# Patient Record
Sex: FEMALE | Race: WHITE | NOT HISPANIC OR LATINO | Employment: FULL TIME | URBAN - METROPOLITAN AREA
[De-identification: names, ages, dates, MRNs, and addresses within clinical notes are randomized per-mention and may not be internally consistent; named-entity substitution may affect disease eponyms.]

---

## 2023-10-12 ENCOUNTER — OFFICE VISIT (OUTPATIENT)
Dept: URGENT CARE | Facility: CLINIC | Age: 18
End: 2023-10-12
Payer: COMMERCIAL

## 2023-10-12 VITALS
RESPIRATION RATE: 16 BRPM | SYSTOLIC BLOOD PRESSURE: 125 MMHG | OXYGEN SATURATION: 100 % | DIASTOLIC BLOOD PRESSURE: 85 MMHG | WEIGHT: 160 LBS | HEART RATE: 86 BPM

## 2023-10-12 DIAGNOSIS — M79.645 PAIN OF FINGER OF LEFT HAND: Primary | ICD-10-CM

## 2023-10-12 DIAGNOSIS — S63.635A SPRAIN OF INTERPHALANGEAL JOINT OF LEFT RING FINGER, INITIAL ENCOUNTER: ICD-10-CM

## 2023-10-12 PROCEDURE — 96372 PR INJECTION,THERAP/PROPH/DIAG2ST, IM OR SUBCUT: ICD-10-PCS | Mod: S$GLB,,, | Performed by: STUDENT IN AN ORGANIZED HEALTH CARE EDUCATION/TRAINING PROGRAM

## 2023-10-12 PROCEDURE — 99204 PR OFFICE/OUTPT VISIT, NEW, LEVL IV, 45-59 MIN: ICD-10-PCS | Mod: 25,S$GLB,, | Performed by: STUDENT IN AN ORGANIZED HEALTH CARE EDUCATION/TRAINING PROGRAM

## 2023-10-12 PROCEDURE — 96372 THER/PROPH/DIAG INJ SC/IM: CPT | Mod: S$GLB,,, | Performed by: STUDENT IN AN ORGANIZED HEALTH CARE EDUCATION/TRAINING PROGRAM

## 2023-10-12 PROCEDURE — 99204 OFFICE O/P NEW MOD 45 MIN: CPT | Mod: 25,S$GLB,, | Performed by: STUDENT IN AN ORGANIZED HEALTH CARE EDUCATION/TRAINING PROGRAM

## 2023-10-12 RX ORDER — KETOROLAC TROMETHAMINE 30 MG/ML
60 INJECTION, SOLUTION INTRAMUSCULAR; INTRAVENOUS
Status: COMPLETED | OUTPATIENT
Start: 2023-10-12 | End: 2023-10-12

## 2023-10-12 RX ORDER — NAPROXEN 500 MG/1
500 TABLET ORAL 2 TIMES DAILY WITH MEALS
Qty: 20 TABLET | Refills: 0 | Status: SHIPPED | OUTPATIENT
Start: 2023-10-12 | End: 2023-10-22

## 2023-10-12 RX ADMIN — KETOROLAC TROMETHAMINE 60 MG: 30 INJECTION, SOLUTION INTRAMUSCULAR; INTRAVENOUS at 10:10

## 2023-10-12 NOTE — PROGRESS NOTES
Subjective:      Patient ID: Gabbie Peterson is a 18 y.o. female.    Vitals:  weight is 72.6 kg (160 lb). Her blood pressure is 125/85 and her pulse is 86. Her respiration is 16 and oxygen saturation is 100%.     Chief Complaint: Hand Pain (She was cracking her knuckles and hurt her left ring finger)    Patient is a 18-year-old female who presents to clinic via mother for evaluation of finger pain.  Patient reports symptoms x2 days.  Patient states she was cracked her knuckles when she heard a crack and felt worsening pain on her left ring finger.  Patient states that she has taken over-the-counter medications with no significant relief to symptoms.  Patient states that symptoms have progressively become more sore and swollen.  Patient states woke up this morning with it harder to being.  Patient states that she experiences constant throbbing pain to the finger.  Patient states intermittent sharp shooting pains into the hand primarily with movement.  Patient states pain at current is rated a 7 on 10 scale but as worst a 10 on 10 scale.  Patient states that she has not noticed any skin discoloration such as redness or bruising, paresthesias such as numbness or tingling, or any open wounds to the finger.  Patient states she has not broke or dislocated her finger in the past.    Hand Pain   The incident occurred 2 days ago. The incident occurred at home. There was no injury mechanism. The pain is present in the left fingers. The pain radiates to the left hand and left arm. Pertinent negatives include no chest pain or numbness. The symptoms are aggravated by movement and palpation. She has tried immobilization for the symptoms.       Constitution: Negative. Negative for chills, sweating, fatigue and fever.   HENT: Negative.     Neck: neck negative.   Cardiovascular: Negative.  Negative for chest pain.   Eyes: Negative.    Respiratory: Negative.  Negative for chest tightness, cough and shortness of breath.     Gastrointestinal: Negative.  Negative for abdominal pain, nausea, vomiting and diarrhea.   Endocrine: negative.   Genitourinary: Negative.    Musculoskeletal:  Positive for trauma (Hurt finger while cracking her knuckles), joint pain (Left ring finger), joint swelling (Left ring finger) and abnormal ROM of joint (Left ring finger).   Skin: Negative.  Negative for color change, pale, rash, wound and erythema.   Allergic/Immunologic: Negative.    Neurological: Negative.  Negative for dizziness, light-headedness, passing out, headaches, disorientation, altered mental status, numbness and tingling.   Hematologic/Lymphatic: Negative.    Psychiatric/Behavioral: Negative.  Negative for altered mental status, disorientation and confusion.       Objective:     Physical Exam   Constitutional: She is oriented to person, place, and time. She appears well-developed. She is cooperative.  Non-toxic appearance. She does not appear ill. No distress.   HENT:   Head: Normocephalic and atraumatic.   Ears:   Right Ear: Hearing, tympanic membrane, external ear and ear canal normal.   Left Ear: Hearing, tympanic membrane, external ear and ear canal normal.   Nose: Nose normal. No mucosal edema or nasal deformity. No epistaxis. Right sinus exhibits no maxillary sinus tenderness and no frontal sinus tenderness. Left sinus exhibits no maxillary sinus tenderness and no frontal sinus tenderness.   Mouth/Throat: Uvula is midline, oropharynx is clear and moist and mucous membranes are normal. No trismus in the jaw. Normal dentition. No uvula swelling.   Eyes: Conjunctivae and lids are normal. Pupils are equal, round, and reactive to light.   Neck: Trachea normal and phonation normal. Neck supple.   Cardiovascular: Normal rate, regular rhythm, normal heart sounds and normal pulses.   Pulmonary/Chest: Effort normal and breath sounds normal. No respiratory distress. She has no wheezes. She has no rhonchi. She has no rales.   Abdominal: Normal  appearance and bowel sounds are normal. She exhibits no distension. Soft. There is no abdominal tenderness.   Musculoskeletal:         General: Signs of injury present.      Left hand: Left ring finger: Exhibits decreased ROM, swelling and tenderness (Primarily at the PIP location).   Neurological: She is alert and oriented to person, place, and time. She exhibits normal muscle tone.   Skin: Skin is warm, dry, intact, not diaphoretic, not pale and no rash. Capillary refill takes less than 2 seconds. No bruising and No erythema   Psychiatric: Her speech is normal and behavior is normal. Judgment and thought content normal.   Nursing note and vitals reviewed.      Assessment:     1. Pain of finger of left hand    2. Sprain of interphalangeal joint of left ring finger, initial encounter        Plan:       Pain of finger of left hand  -     XR HAND COMPLETE 3 VIEW LEFT; Future; Expected date: 10/12/2023    Sprain of interphalangeal joint of left ring finger, initial encounter    Other orders  -     ketorolac injection 60 mg  -     naproxen (NAPROSYN) 500 MG tablet; Take 1 tablet (500 mg total) by mouth 2 (two) times daily with meals. for 10 days  Dispense: 20 tablet; Refill: 0                X-ray left hand: There is no evidence acute bony injury of the left hand.  Toradol 60 mg IM in clinic.  Patient tolerated well.  No complications noted.    Take medications as prescribed.  Use of no other NSAIDs while on naproxen; may rotate with Tylenol.    Finger splint applied in clinic.  Patient tolerated well.  No complications noted.    Rest.  Ice.  Splint.  Elevation.    Follow-up with PCP in 1-2 days.    Follow-up orthopedics if symptoms not better within 1-2 weeks.    To ED for any new or acutely worsening symptoms.    Patient in agreement with plan of care.    DISCLAIMER: Please note that my documentation in this Electronic Healthcare Record was produced using speech recognition software and therefore may contain errors  related to that software system.These could include grammar, punctuation and spelling errors or the inclusion/exclusion of phrases that were not intended. Garbled syntax, mangled pronouns, and other bizarre constructions may be attributed to that software system.

## 2023-11-26 ENCOUNTER — OFFICE VISIT (OUTPATIENT)
Dept: URGENT CARE | Facility: CLINIC | Age: 18
End: 2023-11-26
Payer: COMMERCIAL

## 2023-11-26 VITALS
HEART RATE: 101 BPM | OXYGEN SATURATION: 99 % | SYSTOLIC BLOOD PRESSURE: 120 MMHG | TEMPERATURE: 98 F | HEIGHT: 69 IN | BODY MASS INDEX: 23.7 KG/M2 | DIASTOLIC BLOOD PRESSURE: 72 MMHG | WEIGHT: 160 LBS

## 2023-11-26 DIAGNOSIS — J20.9 ACUTE BRONCHITIS, UNSPECIFIED ORGANISM: Primary | ICD-10-CM

## 2023-11-26 DIAGNOSIS — R05.9 COUGH, UNSPECIFIED TYPE: ICD-10-CM

## 2023-11-26 LAB
CTP QC/QA: YES
CTP QC/QA: YES
FLUAV AG NPH QL: NEGATIVE
FLUBV AG NPH QL: NEGATIVE
SARS-COV-2 AG RESP QL IA.RAPID: NEGATIVE

## 2023-11-26 PROCEDURE — 87804 POCT INFLUENZA A/B: ICD-10-PCS | Mod: QW,,, | Performed by: NURSE PRACTITIONER

## 2023-11-26 PROCEDURE — 87811 SARS CORONAVIRUS 2 ANTIGEN POCT, MANUAL READ: ICD-10-PCS | Mod: QW,S$GLB,, | Performed by: EMERGENCY MEDICINE

## 2023-11-26 PROCEDURE — 96372 THER/PROPH/DIAG INJ SC/IM: CPT | Mod: S$GLB,,, | Performed by: NURSE PRACTITIONER

## 2023-11-26 PROCEDURE — 99213 OFFICE O/P EST LOW 20 MIN: CPT | Mod: 25,S$GLB,, | Performed by: EMERGENCY MEDICINE

## 2023-11-26 PROCEDURE — 87811 SARS-COV-2 COVID19 W/OPTIC: CPT | Mod: QW,S$GLB,, | Performed by: EMERGENCY MEDICINE

## 2023-11-26 PROCEDURE — 87804 INFLUENZA ASSAY W/OPTIC: CPT | Mod: QW,,, | Performed by: NURSE PRACTITIONER

## 2023-11-26 PROCEDURE — 96372 PR INJECTION,THERAP/PROPH/DIAG2ST, IM OR SUBCUT: ICD-10-PCS | Mod: S$GLB,,, | Performed by: NURSE PRACTITIONER

## 2023-11-26 PROCEDURE — 99213 PR OFFICE/OUTPT VISIT, EST, LEVL III, 20-29 MIN: ICD-10-PCS | Mod: 25,S$GLB,, | Performed by: EMERGENCY MEDICINE

## 2023-11-26 RX ORDER — AZITHROMYCIN 250 MG/1
TABLET, FILM COATED ORAL
Qty: 6 TABLET | Refills: 0 | Status: SHIPPED | OUTPATIENT
Start: 2023-11-26 | End: 2023-12-01

## 2023-11-26 RX ORDER — ALBUTEROL SULFATE 90 UG/1
2 AEROSOL, METERED RESPIRATORY (INHALATION) EVERY 6 HOURS PRN
Qty: 18 G | Refills: 0 | Status: SHIPPED | OUTPATIENT
Start: 2023-11-26 | End: 2024-11-25

## 2023-11-26 RX ORDER — DEXAMETHASONE SODIUM PHOSPHATE 4 MG/ML
4 INJECTION, SOLUTION INTRA-ARTICULAR; INTRALESIONAL; INTRAMUSCULAR; INTRAVENOUS; SOFT TISSUE ONCE
Status: COMPLETED | OUTPATIENT
Start: 2023-11-26 | End: 2023-11-26

## 2023-11-26 RX ADMIN — DEXAMETHASONE SODIUM PHOSPHATE 4 MG: 4 INJECTION, SOLUTION INTRA-ARTICULAR; INTRALESIONAL; INTRAMUSCULAR; INTRAVENOUS; SOFT TISSUE at 04:11

## 2023-11-26 NOTE — PROGRESS NOTES
"Subjective:      Patient ID: Gabbie Peterson is a 18 y.o. female.    Vitals:  height is 5' 9" (1.753 m) and weight is 72.6 kg (160 lb). Her oral temperature is 98.3 °F (36.8 °C). Her blood pressure is 120/72 and her pulse is 101. Her oxygen saturation is 99%.     Chief Complaint: Chest Pain (Back pain)    Chest Pain   This is a new problem. The current episode started today. The onset quality is sudden. The problem occurs constantly. The problem has been unchanged. The pain is present in the substernal region. The pain is at a severity of 5/10. The pain is mild. The quality of the pain is described as burning and heavy. The pain radiates to the upper back. Associated symptoms include a cough, dizziness, headaches and irregular heartbeat. The cough has no precipitants. The cough is Non-productive. There is no color change associated with the cough. Nothing relieves the cough. She has tried nothing for the symptoms.     Cardiovascular:  Positive for chest pain.   Respiratory:  Positive for cough.    Neurological:  Positive for dizziness and headaches.    Objective:     Physical Exam    Assessment:     No diagnosis found.    Plan:       There are no diagnoses linked to this encounter.                "

## 2023-11-27 NOTE — PROGRESS NOTES
"Subjective:      Patient ID: Gabbie Peterson is a 18 y.o. female.    Vitals:  height is 5' 9" (1.753 m) and weight is 72.6 kg (160 lb). Her oral temperature is 98.3 °F (36.8 °C). Her blood pressure is 120/72 and her pulse is 101. Her oxygen saturation is 99%.     Chief Complaint: Chest Pain (Back pain)    Presents accompanied by mom with sudden onset chest tightness while at work today.  Similar to chest tightness she experienced her last course of covid.        Constitution: Negative for chills and fever.   Respiratory:  Positive for chest tightness, cough and wheezing.       Objective:     Physical Exam   Constitutional: She is oriented to person, place, and time. She appears well-developed. She is cooperative.  Non-toxic appearance. She does not appear ill. No distress.   HENT:   Head: Normocephalic and atraumatic.   Ears:   Right Ear: Hearing, tympanic membrane, external ear and ear canal normal.   Left Ear: Hearing, tympanic membrane, external ear and ear canal normal.   Nose: Nose normal. No mucosal edema, rhinorrhea or nasal deformity. No epistaxis. Right sinus exhibits no maxillary sinus tenderness and no frontal sinus tenderness. Left sinus exhibits no maxillary sinus tenderness and no frontal sinus tenderness.   Mouth/Throat: Uvula is midline, oropharynx is clear and moist and mucous membranes are normal. No trismus in the jaw. Normal dentition. No uvula swelling. No oropharyngeal exudate, posterior oropharyngeal edema or posterior oropharyngeal erythema.   Eyes: Conjunctivae and lids are normal. No scleral icterus.   Neck: Trachea normal and phonation normal. Neck supple. No edema present. No erythema present. No neck rigidity present.   Cardiovascular: Normal rate, regular rhythm, normal heart sounds and normal pulses.   Pulmonary/Chest: Effort normal. No respiratory distress. She has no decreased breath sounds. She has wheezes. She has no rhonchi.   Abdominal: Normal appearance.   Musculoskeletal: Normal " range of motion.         General: No deformity. Normal range of motion.   Neurological: She is alert and oriented to person, place, and time. She exhibits normal muscle tone. Coordination normal.   Skin: Skin is warm, dry, intact, not diaphoretic and not pale.   Psychiatric: Her speech is normal and behavior is normal. Judgment and thought content normal.   Nursing note and vitals reviewed.      Assessment:     1. Acute bronchitis, unspecified organism    2. Cough, unspecified type        Plan:       Acute bronchitis, unspecified organism  -     SARS Coronavirus 2 Antigen, POCT Manual Read  -     POCT Influenza A/B Rapid Antigen    Cough, unspecified type    Other orders  -     azithromycin (Z-MANOLO) 250 MG tablet; Take 2 tablets by mouth on day 1; Take 1 tablet by mouth on days 2-5  Dispense: 6 tablet; Refill: 0  -     dexAMETHasone injection 4 mg  -     albuterol (PROAIR HFA) 90 mcg/actuation inhaler; Inhale 2 puffs into the lungs every 6 (six) hours as needed for Wheezing or Shortness of Breath (chest tightness). Rescue  Dispense: 18 g; Refill: 0    Push fluids; tylenol or ibuprofen as needed for fever or discomfort.  F/u with PCP; to ER for worsening of symptoms.

## 2024-06-09 ENCOUNTER — OFFICE VISIT (OUTPATIENT)
Dept: URGENT CARE | Facility: CLINIC | Age: 19
End: 2024-06-09
Payer: COMMERCIAL

## 2024-06-09 VITALS
SYSTOLIC BLOOD PRESSURE: 126 MMHG | RESPIRATION RATE: 18 BRPM | HEART RATE: 106 BPM | HEIGHT: 69 IN | WEIGHT: 170 LBS | OXYGEN SATURATION: 99 % | DIASTOLIC BLOOD PRESSURE: 82 MMHG | BODY MASS INDEX: 25.18 KG/M2 | TEMPERATURE: 98 F

## 2024-06-09 DIAGNOSIS — Z32.02 PREGNANCY TEST NEGATIVE: ICD-10-CM

## 2024-06-09 DIAGNOSIS — N91.2 AMENORRHEA: Primary | ICD-10-CM

## 2024-06-09 LAB
B-HCG UR QL: NEGATIVE
CTP QC/QA: YES

## 2024-06-09 PROCEDURE — 99214 OFFICE O/P EST MOD 30 MIN: CPT | Mod: S$GLB,,, | Performed by: STUDENT IN AN ORGANIZED HEALTH CARE EDUCATION/TRAINING PROGRAM

## 2024-06-09 PROCEDURE — 81025 URINE PREGNANCY TEST: CPT | Mod: S$GLB,,, | Performed by: STUDENT IN AN ORGANIZED HEALTH CARE EDUCATION/TRAINING PROGRAM

## 2024-08-30 ENCOUNTER — HOSPITAL ENCOUNTER (EMERGENCY)
Facility: HOSPITAL | Age: 19
Discharge: HOME | End: 2024-08-30
Attending: STUDENT IN AN ORGANIZED HEALTH CARE EDUCATION/TRAINING PROGRAM
Payer: COMMERCIAL

## 2024-08-30 VITALS
BODY MASS INDEX: 27.17 KG/M2 | OXYGEN SATURATION: 100 % | SYSTOLIC BLOOD PRESSURE: 113 MMHG | RESPIRATION RATE: 18 BRPM | WEIGHT: 184 LBS | DIASTOLIC BLOOD PRESSURE: 67 MMHG | TEMPERATURE: 99 F | HEART RATE: 74 BPM

## 2024-08-30 DIAGNOSIS — K62.5 RECTAL BLEEDING: Primary | ICD-10-CM

## 2024-08-30 DIAGNOSIS — B96.89 BACTERIAL VAGINOSIS IN PREGNANCY: ICD-10-CM

## 2024-08-30 DIAGNOSIS — Z3A.10 10 WEEKS GESTATION OF PREGNANCY: ICD-10-CM

## 2024-08-30 DIAGNOSIS — O23.599 BACTERIAL VAGINOSIS IN PREGNANCY: ICD-10-CM

## 2024-08-30 LAB
ALBUMIN SERPL BCP-MCNC: 4.1 G/DL (ref 3.5–5.2)
ALP SERPL-CCNC: 51 U/L (ref 55–135)
ALT SERPL W/O P-5'-P-CCNC: 19 U/L (ref 10–44)
AMORPH CRY URNS QL MICRO: NORMAL
ANION GAP SERPL CALC-SCNC: 7 MMOL/L (ref 8–16)
AST SERPL-CCNC: 16 U/L (ref 10–40)
BACTERIA #/AREA URNS HPF: NORMAL /HPF
BACTERIA GENITAL QL WET PREP: ABNORMAL
BASOPHILS # BLD AUTO: 0.03 K/UL (ref 0–0.2)
BASOPHILS NFR BLD: 0.3 % (ref 0–1.9)
BILIRUB SERPL-MCNC: 0.6 MG/DL (ref 0.1–1)
BILIRUB UR QL STRIP: NEGATIVE
BUN SERPL-MCNC: 6 MG/DL (ref 6–20)
CALCIUM SERPL-MCNC: 8.7 MG/DL (ref 8.7–10.5)
CHLORIDE SERPL-SCNC: 108 MMOL/L (ref 95–110)
CLARITY UR: ABNORMAL
CLUE CELLS VAG QL WET PREP: ABNORMAL
CO2 SERPL-SCNC: 24 MMOL/L (ref 23–29)
COLOR UR: ABNORMAL
CREAT SERPL-MCNC: 0.5 MG/DL (ref 0.5–1.4)
DIFFERENTIAL METHOD BLD: ABNORMAL
EOSINOPHIL # BLD AUTO: 0.4 K/UL (ref 0–0.5)
EOSINOPHIL NFR BLD: 4.4 % (ref 0–8)
ERYTHROCYTE [DISTWIDTH] IN BLOOD BY AUTOMATED COUNT: 12.1 % (ref 11.5–14.5)
EST. GFR  (NO RACE VARIABLE): >60 ML/MIN/1.73 M^2
FILAMENT FUNGI VAG WET PREP-#/AREA: ABNORMAL
GLUCOSE SERPL-MCNC: 105 MG/DL (ref 70–110)
GLUCOSE SERPL-MCNC: 54 MG/DL (ref 70–110)
GLUCOSE SERPL-MCNC: 81 MG/DL (ref 70–110)
GLUCOSE UR QL STRIP: NEGATIVE
HCG INTACT+B SERPL-ACNC: NORMAL MIU/ML
HCT VFR BLD AUTO: 38.4 % (ref 37–48.5)
HGB BLD-MCNC: 13.1 G/DL (ref 12–16)
HGB UR QL STRIP: NEGATIVE
IMM GRANULOCYTES # BLD AUTO: 0.04 K/UL (ref 0–0.04)
IMM GRANULOCYTES NFR BLD AUTO: 0.4 % (ref 0–0.5)
KETONES UR QL STRIP: NEGATIVE
LEUKOCYTE ESTERASE UR QL STRIP: ABNORMAL
LYMPHOCYTES # BLD AUTO: 2.1 K/UL (ref 1–4.8)
LYMPHOCYTES NFR BLD: 21.3 % (ref 18–48)
MCH RBC QN AUTO: 31.6 PG (ref 27–31)
MCHC RBC AUTO-ENTMCNC: 34.1 G/DL (ref 32–36)
MCV RBC AUTO: 93 FL (ref 82–98)
MICROSCOPIC COMMENT: NORMAL
MONOCYTES # BLD AUTO: 0.5 K/UL (ref 0.3–1)
MONOCYTES NFR BLD: 4.8 % (ref 4–15)
NEUTROPHILS # BLD AUTO: 6.9 K/UL (ref 1.8–7.7)
NEUTROPHILS NFR BLD: 68.8 % (ref 38–73)
NITRITE UR QL STRIP: NEGATIVE
NRBC BLD-RTO: 0 /100 WBC
PH UR STRIP: 7 [PH] (ref 5–8)
PLATELET # BLD AUTO: 263 K/UL (ref 150–450)
PMV BLD AUTO: 10 FL (ref 9.2–12.9)
POTASSIUM SERPL-SCNC: 3.4 MMOL/L (ref 3.5–5.1)
PROT SERPL-MCNC: 6.7 G/DL (ref 6–8.4)
PROT UR QL STRIP: NEGATIVE
RBC # BLD AUTO: 4.15 M/UL (ref 4–5.4)
RBC #/AREA URNS HPF: 2 /HPF (ref 0–4)
SODIUM SERPL-SCNC: 139 MMOL/L (ref 136–145)
SP GR UR STRIP: 1.01 (ref 1–1.03)
SPECIMEN SOURCE: ABNORMAL
SQUAMOUS #/AREA URNS HPF: 8 /HPF
T VAGINALIS GENITAL QL WET PREP: ABNORMAL
URN SPEC COLLECT METH UR: ABNORMAL
UROBILINOGEN UR STRIP-ACNC: NEGATIVE EU/DL
WBC # BLD AUTO: 10.02 K/UL (ref 3.9–12.7)
WBC #/AREA URNS HPF: 4 /HPF (ref 0–5)
WBC #/AREA VAG WET PREP: ABNORMAL
YEAST GENITAL QL WET PREP: ABNORMAL

## 2024-08-30 PROCEDURE — 85025 COMPLETE CBC W/AUTO DIFF WBC: CPT | Performed by: PHYSICIAN ASSISTANT

## 2024-08-30 PROCEDURE — 87591 N.GONORRHOEAE DNA AMP PROB: CPT | Performed by: STUDENT IN AN ORGANIZED HEALTH CARE EDUCATION/TRAINING PROGRAM

## 2024-08-30 PROCEDURE — 80053 COMPREHEN METABOLIC PANEL: CPT | Performed by: PHYSICIAN ASSISTANT

## 2024-08-30 PROCEDURE — 87210 SMEAR WET MOUNT SALINE/INK: CPT | Performed by: STUDENT IN AN ORGANIZED HEALTH CARE EDUCATION/TRAINING PROGRAM

## 2024-08-30 PROCEDURE — 87491 CHLMYD TRACH DNA AMP PROBE: CPT | Performed by: STUDENT IN AN ORGANIZED HEALTH CARE EDUCATION/TRAINING PROGRAM

## 2024-08-30 PROCEDURE — 84702 CHORIONIC GONADOTROPIN TEST: CPT | Performed by: PHYSICIAN ASSISTANT

## 2024-08-30 PROCEDURE — 81001 URINALYSIS AUTO W/SCOPE: CPT | Performed by: PHYSICIAN ASSISTANT

## 2024-08-30 PROCEDURE — 99283 EMERGENCY DEPT VISIT LOW MDM: CPT

## 2024-08-30 PROCEDURE — 82962 GLUCOSE BLOOD TEST: CPT

## 2024-08-30 RX ORDER — METRONIDAZOLE 500 MG/1
500 TABLET ORAL 2 TIMES DAILY
Qty: 14 TABLET | Refills: 0 | Status: SHIPPED | OUTPATIENT
Start: 2024-08-30 | End: 2024-09-06

## 2024-08-30 RX ORDER — DEXTROSE 50 % IN WATER (D50W) INTRAVENOUS SYRINGE
25
Status: DISCONTINUED | OUTPATIENT
Start: 2024-08-30 | End: 2024-08-30 | Stop reason: HOSPADM

## 2024-08-30 NOTE — ED PROVIDER NOTES
Encounter Date: 8/30/2024       History     Chief Complaint   Patient presents with    Rectal Bleeding     X2 days, blood with clots present in stool. Hx of GI bleeds.  Pt is also approx 10 wks pregnant.      HPI    19-year-old female G1 at 9 weeks and 5 days gestational age presenting for evaluation of rectal bleeding.  Patient reports history of rectal bleeding in the past for which she was admitted and evaluated by gastroenterology and had a spur.  She is reporting 3 days of worsening blood from the rectum with blood notable on the toilet paper and on top of the stool when she uses the bathroom.      She has no pain with defecation and has no abdominal pain however mild abdominal cramping is present.  She denies vaginal bleeding, loss of fluid from the vagina, fever, nausea, vomiting.  She denies chest pain, shortness of breath, dizziness worse.    Review of patient's allergies indicates:  No Known Allergies   No past medical history on file.  No past surgical history on file.  No family history on file.     Review of Systems  As where noted above  Physical Exam     Initial Vitals [08/30/24 1412]   BP Pulse Resp Temp SpO2   (!) 117/57 93 20 98.6 °F (37 °C) 100 %      MAP       --         Physical Exam    Nursing note and vitals reviewed.  Constitutional: She appears well-developed and well-nourished.   HENT:   Head: Normocephalic and atraumatic.   Eyes: EOM are normal. Pupils are equal, round, and reactive to light.   Neck: Neck supple.   Normal range of motion.  Cardiovascular:  Normal rate.     Exam reveals no gallop and no friction rub.       No murmur heard.  Pulmonary/Chest: Breath sounds normal. She has no wheezes. She has no rhonchi. She has no rales.   Abdominal: Abdomen is soft. Bowel sounds are normal. She exhibits no mass. There is no rebound and no guarding.   Genitourinary:    Vagina normal.     Musculoskeletal:         General: Normal range of motion.      Cervical back: Normal range of motion and  neck supple.     Neurological: She is oriented to person, place, and time. GCS score is 15. GCS eye subscore is 4. GCS verbal subscore is 5. GCS motor subscore is 6.   Skin: Capillary refill takes less than 2 seconds.         ED Course   Procedures  Labs Reviewed   VAGINAL SCREEN - Abnormal       Result Value    Trichomonas None      Clue Cells Few (*)     Budding Yeast None      Fungal Hyphae None      WBC - Vaginal Screen Occasional (*)     Bacteria - Vaginal Screen Few (*)     Wet Prep Source Vagina      Narrative:     Release to patient->Immediate   CBC W/ AUTO DIFFERENTIAL - Abnormal    WBC 10.02      RBC 4.15      Hemoglobin 13.1      Hematocrit 38.4      MCV 93      MCH 31.6 (*)     MCHC 34.1      RDW 12.1      Platelets 263      MPV 10.0      Immature Granulocytes 0.4      Gran # (ANC) 6.9      Immature Grans (Abs) 0.04      Lymph # 2.1      Mono # 0.5      Eos # 0.4      Baso # 0.03      nRBC 0      Gran % 68.8      Lymph % 21.3      Mono % 4.8      Eosinophil % 4.4      Basophil % 0.3      Differential Method Automated      Narrative:     Release to patient->Immediate   COMPREHENSIVE METABOLIC PANEL - Abnormal    Sodium 139      Potassium 3.4 (*)     Chloride 108      CO2 24      Glucose 54 (*)     BUN 6      Creatinine 0.5      Calcium 8.7      Total Protein 6.7      Albumin 4.1      Total Bilirubin 0.6      Alkaline Phosphatase 51 (*)     AST 16      ALT 19      eGFR >60.0      Anion Gap 7 (*)     Narrative:     Release to patient->Immediate   URINALYSIS, REFLEX TO URINE CULTURE - Abnormal    Specimen UA Urine, Clean Catch      Color, UA Straw      Appearance, UA Hazy (*)     pH, UA 7.0      Specific Gravity, UA 1.015      Protein, UA Negative      Glucose, UA Negative      Ketones, UA Negative      Bilirubin (UA) Negative      Occult Blood UA Negative      Nitrite, UA Negative      Urobilinogen, UA Negative      Leukocytes, UA Trace (*)     Narrative:     Specimen Source->Urine   HCG, QUANTITATIVE     HCG Quant 78556.11      Narrative:     Release to patient->Immediate   URINALYSIS MICROSCOPIC    RBC, UA 2      WBC, UA 4      Bacteria Rare      Squam Epithel, UA 8      Amorphous, UA Rare      Microscopic Comment SEE COMMENT      Narrative:     Specimen Source->Urine   C. TRACHOMATIS/N. GONORRHOEAE BY AMP DNA   POCT GLUCOSE    POC Glucose 105     POCT GLUCOSE    POC Glucose 81            Imaging Results    None          Medications - No data to display  Medical Decision Making  19-year-old female G1 presenting for evaluation of rectal pain.  She is afebrile and hemodynamically stable on evaluation with the appropriate blood pressure for pregnancy at 117/57. Glucose 54 with improvement after juice intake. Exam with vaginal discharge, closed cervical os and no blood in vaginal vault. No kristen blood of rectal exam and no external hemorrhoid.  on ultrasound and fetus in moving in the uterine cavity. Labs with +clue cells c/f BV. GC/Chlamydia sent. Labs otherwise ok. Will treat with flagyl. No c/f , severe GIB. Feel patient ok to follow up with OB on Tuesday and GI as needed in [place] where she is established.     Lula Segundo MD        Amount and/or Complexity of Data Reviewed  Labs: ordered. Decision-making details documented in ED Course.    Risk  Prescription drug management.               ED Course as of 24 0018   Fri Aug 30, 2024   1734 POC Glucose: 105 [MH]   1734 Glucose(!!): 54 [MH]   1734 Glucose improved with apple juice [MH]   1734 Beta HCG Quant: 31604.11 [MH]   1853 Clue Cells, Wet Prep(!): Few [MH]   1853 Bacteria - Vaginal Screen(!): Few [MH]      ED Course User Index  [MH] Llua Segundo MD                           Clinical Impression:  Final diagnoses:  [K62.5] Rectal bleeding (Primary)  [Z3A.10] 10 weeks gestation of pregnancy  [O23.599, B96.89] Bacterial vaginosis in pregnancy          ED Disposition Condition    Discharge Stable          ED Prescriptions        Medication Sig Dispense Start Date End Date Auth. Provider    metroNIDAZOLE (FLAGYL) 500 MG tablet Take 1 tablet (500 mg total) by mouth 2 (two) times a day. for 7 days 14 tablet 8/30/2024 9/6/2024 Lula Segundo MD          Follow-up Information       Follow up With Specialties Details Why Contact Info Additional Information    [facility] - Emergency Dept Emergency Medicine Go to  As needed, If symptoms worsen [address] [facility] [address]  [phone] 1st floor             Lula Segundo MD  Resident  08/31/24 0019

## 2024-08-30 NOTE — DISCHARGE INSTRUCTIONS
Please return to the emergency department if you develop vaginal bleeding, abdominal pain, fever, chest pain, shortness of breath, dizziness or worsening bleeding from the rectum.  Please follow up as soon as possible with your OBGYN and your gastroenterologist.    Greg Whittington MD   [address] [place] [address] [place]   [facility] [place] [address] [phone] (Work)   [phone] (Fax)

## 2024-08-30 NOTE — FIRST PROVIDER EVALUATION
Emergency Department TeleTriage Encounter Note      CHIEF COMPLAINT    Chief Complaint   Patient presents with    Rectal Bleeding     X2 days, blood with clots present in stool. Hx of GI bleeds.  Pt is also approx 10 wks pregnant.        VITAL SIGNS   Initial Vitals [08/30/24 1412]   BP Pulse Resp Temp SpO2   (!) 117/57 93 20 98.6 °F (37 °C) 100 %      MAP       --            ALLERGIES    Review of patient's allergies indicates:  No Known Allergies    PROVIDER TRIAGE NOTE  This is a teletriage evaluation of a 19 y.o. female presenting to the ED complaining of rectal bleeding. Patient reports BRBPR for 2 days. She is now passing clots. Patient has history of GI bleed. She is 10 weeks pregnant.    Patient is alert and oriented. She speaks in complete sentences. She is sitting upright in the chair in no distress.     Initial orders will be placed and care will be transferred to an alternate provider when patient is roomed for a full evaluation. Any additional orders and the final disposition will be determined by that provider.         ORDERS  Labs Reviewed   CBC W/ AUTO DIFFERENTIAL   COMPREHENSIVE METABOLIC PANEL   URINALYSIS, REFLEX TO URINE CULTURE   HCG, QUANTITATIVE       ED Orders (720h ago, onward)      Start Ordered     Status Ordering Provider    08/30/24 1420 08/30/24 1419  Vital signs  Every 2 hours         Ordered MINI, ROSEY G.    08/30/24 1420 08/30/24 1419  hCG, quantitative, pregnancy  STAT         Ordered MINI, ROSEY G.    08/30/24 1419 08/30/24 1419  Diet NPO  Diet effective now         Ordered MINI, ROSEY G.    08/30/24 1419 08/30/24 1419  Insert peripheral IV  Once         Ordered MINI, ROSEY G.    08/30/24 1419 08/30/24 1419  CBC W/ AUTO DIFFERENTIAL  STAT         Ordered MINI, ROSEY G.    08/30/24 1419 08/30/24 1419  Comp. Metabolic Panel  STAT         Ordered MINI, ROSEY G.    08/30/24 1419 08/30/24 1419  Urinalysis, Reflex to Urine Culture Urine, Clean Catch  STAT         Ordered MINI,  ROSEY GUEVARA              Virtual Visit Note: The provider triage portion of this emergency department evaluation and documentation was performed via [employer], a HIPAA-compliant telemedicine application, in concert with a tele-presenter in the room. A face to face patient evaluation with one of my colleagues will occur once the patient is placed in an emergency department room.      DISCLAIMER: This note was prepared with [employer] voice recognition transcription software. Garbled syntax, mangled pronouns, and other bizarre constructions may be attributed to that software system.

## 2024-09-02 LAB
CHLAMYDIA, AMPLIFIED DNA: NEGATIVE
N GONORRHOEAE, AMPLIFIED DNA: NEGATIVE

## 2024-10-09 ENCOUNTER — OFFICE VISIT (OUTPATIENT)
Dept: URGENT CARE | Facility: CLINIC | Age: 19
End: 2024-10-09
Payer: COMMERCIAL

## 2024-10-09 VITALS
WEIGHT: 190 LBS | TEMPERATURE: 98 F | HEART RATE: 84 BPM | HEIGHT: 69 IN | SYSTOLIC BLOOD PRESSURE: 119 MMHG | DIASTOLIC BLOOD PRESSURE: 66 MMHG | BODY MASS INDEX: 28.14 KG/M2

## 2024-10-09 DIAGNOSIS — J30.2 SEASONAL ALLERGIES: Primary | ICD-10-CM

## 2024-10-09 PROCEDURE — 99214 OFFICE O/P EST MOD 30 MIN: CPT | Mod: S$GLB,,, | Performed by: STUDENT IN AN ORGANIZED HEALTH CARE EDUCATION/TRAINING PROGRAM

## 2024-10-09 NOTE — LETTER
October 9, 2024      [facility]  [address]  [address]  [place] [address]  Phone: [phone]  Fax: [phone]       Patient: Gabbie Peterson   YOB: 2005  Date of Visit: 10/09/2024    To Whom It May Concern:    Kris Peterson  was at [facility] on 10/09/2024. The patient may return to work/school on 10/10/2024 with no restrictions. If you have any questions or concerns, or if I can be of further assistance, please do not hesitate to contact me.    Sincerely,    Ashley Higgins RT

## 2024-10-09 NOTE — PROGRESS NOTES
"Dictation #1  MRN:[id number]  [facility]:[id number] Subjective:      Patient ID: Gabbie Peterson is a 19 y.o. female.    Vitals:  height is 5' 9" (1.753 m) and weight is 86.2 kg (190 lb). Her temperature is 98.4 °F (36.9 °C). Her blood pressure is 119/66 and her pulse is 84.     Chief Complaint: Sore Throat and Nasal Congestion    Ambulatory to room with complaint of congestion sore throat and sinus pressure since yesterday.  Patient states wanted to be checked out to make sure she "did not have anything" since she is pregnant.    Sinus Problem  This is a new problem. The current episode started yesterday. The problem is unchanged. There has been no fever. Associated symptoms include congestion, coughing and a sore throat.       Constitution: Negative. Negative for fever.   HENT:  Positive for congestion and sore throat.    Neck: neck negative.   Cardiovascular: Negative.    Eyes: Negative.    Respiratory:  Positive for cough.    Gastrointestinal: Negative.    Genitourinary: Negative.    Musculoskeletal: Negative.    Skin: Negative.    Allergic/Immunologic: Negative.    Neurological: Negative.    Psychiatric/Behavioral: Negative.        Objective:     Physical Exam   Constitutional: She is oriented to person, place, and time. She appears well-developed. She is cooperative.  Non-toxic appearance. She does not appear ill. No distress.   HENT:   Head: Normocephalic and atraumatic.   Ears:   Right Ear: Hearing, tympanic membrane, external ear and ear canal normal.   Left Ear: Hearing, tympanic membrane, external ear and ear canal normal.   Nose: Congestion present. No mucosal edema, rhinorrhea or nasal deformity. No epistaxis. Right sinus exhibits no maxillary sinus tenderness and no frontal sinus tenderness. Left sinus exhibits no maxillary sinus tenderness and no frontal sinus tenderness.   Mouth/Throat: Uvula is midline, oropharynx is clear and moist and mucous membranes are normal. No trismus in the jaw. Normal dentition. No " uvula swelling. No oropharyngeal exudate, posterior oropharyngeal edema or posterior oropharyngeal erythema.   Eyes: Conjunctivae and lids are normal. No scleral icterus.   Neck: Trachea normal and phonation normal. Neck supple. No edema present. No erythema present. No neck rigidity present.   Cardiovascular: Normal rate, regular rhythm, normal heart sounds and normal pulses.   Pulmonary/Chest: Effort normal and breath sounds normal. No stridor. No respiratory distress. She has no decreased breath sounds. She has no wheezes. She has no rhonchi. She has no rales.   Abdominal: Normal appearance.   Musculoskeletal: Normal range of motion.         General: No deformity. Normal range of motion.   Neurological: She is alert and oriented to person, place, and time. She exhibits normal muscle tone. Coordination normal.   Skin: Skin is warm, dry, intact, not diaphoretic and not pale.   Psychiatric: Her speech is normal and behavior is normal. Judgment and thought content normal.   Nursing note and vitals reviewed.      Assessment:     1. Seasonal allergies        Plan:       Seasonal allergies      Continue Tylenol as needed for body aches.  - To ED for any new or acutely worsening symptoms including but not limited to chest pain, palpitations, shortness of breath, or fever greater than 103° F.  Patient in agreement with plan of care.    - The diagnosis, treatment plan, instructions for follow-up and reevaluation as well as ED precautions were discussed and understanding was verbalized. All questions or concerns have been addressed.  -Follow up with your primary care provider for continued evaluation and management.